# Patient Record
Sex: FEMALE | Race: WHITE | Employment: FULL TIME | URBAN - METROPOLITAN AREA
[De-identification: names, ages, dates, MRNs, and addresses within clinical notes are randomized per-mention and may not be internally consistent; named-entity substitution may affect disease eponyms.]

---

## 2017-07-14 ENCOUNTER — TELEPHONE (OUTPATIENT)
Dept: OBGYN CLINIC | Facility: CLINIC | Age: 22
End: 2017-07-14

## 2017-07-14 NOTE — TELEPHONE ENCOUNTER
LM with pharmacist stating that pt is overdue for annual exam and needs to call our office to schedule before refill can be addressed.

## 2017-08-18 ENCOUNTER — OFFICE VISIT (OUTPATIENT)
Dept: OBGYN CLINIC | Facility: CLINIC | Age: 22
End: 2017-08-18

## 2017-08-18 VITALS
DIASTOLIC BLOOD PRESSURE: 85 MMHG | WEIGHT: 213.81 LBS | HEIGHT: 69.5 IN | HEART RATE: 102 BPM | BODY MASS INDEX: 30.96 KG/M2 | SYSTOLIC BLOOD PRESSURE: 134 MMHG

## 2017-08-18 DIAGNOSIS — Z12.4 CERVICAL CANCER SCREENING: ICD-10-CM

## 2017-08-18 DIAGNOSIS — Z01.419 ENCOUNTER FOR GYNECOLOGICAL EXAMINATION WITHOUT ABNORMAL FINDING: Primary | ICD-10-CM

## 2017-08-18 PROCEDURE — 99395 PREV VISIT EST AGE 18-39: CPT | Performed by: OBSTETRICS & GYNECOLOGY

## 2017-08-18 RX ORDER — NORGESTIMATE AND ETHINYL ESTRADIOL 7DAYSX3 LO
1 KIT ORAL
Qty: 28 TABLET | Refills: 12 | Status: SHIPPED | OUTPATIENT
Start: 2017-08-18 | End: 2018-08-15

## 2017-08-18 NOTE — PROGRESS NOTES
Well Woman Exam    HPI:  The patient is a 22yo female who presents for annual exam. She has no GYN concerns. She has been on OCPs for PCO in the past and doing well with them. She is not currently sexually active. She has regular menses with her pills.  She constipation  Genitourinary:  denies dysuria, incontinence, abnormal vaginal discharge, vaginal itching  Musculoskeletal:  denies back pain. Skin/Breast:  Denies any breast pain, lumps, or discharge.    Neurological:  denies headaches, extremity weakness

## 2018-02-12 ENCOUNTER — HOSPITAL ENCOUNTER (OUTPATIENT)
Age: 23
Discharge: HOME | End: 2018-02-12
Attending: FAMILY MEDICINE
Payer: COMMERCIAL

## 2018-02-12 VITALS
SYSTOLIC BLOOD PRESSURE: 145 MMHG | BODY MASS INDEX: 29 KG/M2 | RESPIRATION RATE: 16 BRPM | DIASTOLIC BLOOD PRESSURE: 84 MMHG | WEIGHT: 200 LBS | OXYGEN SATURATION: 100 % | HEART RATE: 90 BPM | TEMPERATURE: 98 F

## 2018-02-12 DIAGNOSIS — J02.9 ACUTE PHARYNGITIS, UNSPECIFIED ETIOLOGY: Primary | ICD-10-CM

## 2018-02-12 LAB — S PYO AG THROAT QL: NEGATIVE

## 2018-02-12 PROCEDURE — 99202 OFFICE O/P NEW SF 15 MIN: CPT

## 2018-02-12 PROCEDURE — 99212 OFFICE O/P EST SF 10 MIN: CPT

## 2018-02-12 PROCEDURE — 87430 STREP A AG IA: CPT

## 2018-02-12 NOTE — ED PROVIDER NOTES
No chief complaint on file. HPI:     Fatoumata Patel is a 20year old female who presents with for chief complaint of  sore throat, low grade fever X 1 day. Noted exudates over the tonsils.    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 Neck: no adenopathy  RESPIRATORY:   Lungs: clear to auscultation bilaterally. No chest wall retractions. No respiratory distress.  No tachypnea noted  CARDIOVASCULAR:   Heart: S1, S2 normal, no murmur, click, rub or gallop, regular rate and rhyth

## 2018-04-16 ENCOUNTER — OFFICE VISIT (OUTPATIENT)
Dept: OTOLARYNGOLOGY | Facility: CLINIC | Age: 23
End: 2018-04-16

## 2018-04-16 VITALS
SYSTOLIC BLOOD PRESSURE: 112 MMHG | DIASTOLIC BLOOD PRESSURE: 70 MMHG | TEMPERATURE: 99 F | WEIGHT: 190 LBS | BODY MASS INDEX: 27.2 KG/M2 | HEIGHT: 70 IN

## 2018-04-16 DIAGNOSIS — J35.1 TONSILLAR HYPERTROPHY: ICD-10-CM

## 2018-04-16 DIAGNOSIS — K21.00 GASTROESOPHAGEAL REFLUX DISEASE WITH ESOPHAGITIS: Primary | ICD-10-CM

## 2018-04-16 PROCEDURE — 31575 DIAGNOSTIC LARYNGOSCOPY: CPT | Performed by: OTOLARYNGOLOGY

## 2018-04-16 PROCEDURE — 99212 OFFICE O/P EST SF 10 MIN: CPT | Performed by: OTOLARYNGOLOGY

## 2018-04-16 PROCEDURE — 99244 OFF/OP CNSLTJ NEW/EST MOD 40: CPT | Performed by: OTOLARYNGOLOGY

## 2018-04-16 RX ORDER — DEXAMETHASONE 6 MG/1
6 TABLET ORAL EVERY MORNING
Qty: 3 TABLET | Refills: 0 | Status: SHIPPED | OUTPATIENT
Start: 2018-04-16 | End: 2018-04-19

## 2018-04-16 RX ORDER — OMEPRAZOLE 40 MG/1
40 CAPSULE, DELAYED RELEASE ORAL DAILY
Qty: 30 CAPSULE | Refills: 11 | Status: SHIPPED | OUTPATIENT
Start: 2018-04-16 | End: 2018-04-19

## 2018-04-16 NOTE — PROGRESS NOTES
Aurora Kenyon is a 20year old female.   Patient presents with:  Throat Problem: pt reports recurring  sore throat pain, gets worse at night, very dry mouth, coughs after drinking for 1 month, pain when swallowing, had throat pain as a child      HISTORY Negative Fatigue, fever and weight loss. ENMT Negative Drooling. Eyes Negative Blurred vision and vision changes. Respiratory Negative Dyspnea and wheezing. Cardio Negative Chest pain, irregular heartbeat/palpitations and syncope.    GI Negative Abd Right: Normal, Left: Normal.   PROCEDURE: Fiberoptic laryngoscopy performed with topical lidocaine and oxymetazoline. After discussing indication's and potential side effects. The patient stated that they understood and wished for me to proceed.  Topica recommend a formal gastroenterology evaluation.    - Meenu Garcia    2.  Tonsillar hypertrophy  I recommended a trial of dexamethasone for a few days to see if her symptom goes away if his symptom goes away it may be because she has to

## 2018-04-19 ENCOUNTER — TELEPHONE (OUTPATIENT)
Dept: OTOLARYNGOLOGY | Facility: CLINIC | Age: 23
End: 2018-04-19

## 2018-04-19 RX ORDER — ESOMEPRAZOLE MAGNESIUM 40 MG/1
40 CAPSULE, DELAYED RELEASE ORAL
Qty: 30 CAPSULE | Refills: 11 | Status: SHIPPED | OUTPATIENT
Start: 2018-04-19 | End: 2018-04-24

## 2018-04-19 NOTE — TELEPHONE ENCOUNTER
Pt's LOV 4/16/18, GERD, omeprazole prescribed. Per pt, omeprazole is not covered by her insurance. Per pharmacy, generic nexium may be covered. Dr. Garrick Garcia, please advise if okay to change Rx and advise on dose and instructions.

## 2018-04-19 NOTE — TELEPHONE ENCOUNTER
Rx for generic nexium 40 mg sent to pt's pharmacy on file; yue per [facility].

## 2018-04-24 ENCOUNTER — TELEPHONE (OUTPATIENT)
Dept: OTOLARYNGOLOGY | Facility: CLINIC | Age: 23
End: 2018-04-24

## 2018-04-24 RX ORDER — ESOMEPRAZOLE MAGNESIUM 40 MG/1
40 CAPSULE, DELAYED RELEASE ORAL
Qty: 30 CAPSULE | Refills: 11 | Status: SHIPPED | OUTPATIENT
Start: 2018-04-24 | End: 2018-05-31 | Stop reason: ALTCHOICE

## 2018-04-24 NOTE — TELEPHONE ENCOUNTER
Vikki Flores At  [facility]  ([phone])  No PPI covered on pt plan, appeal will need to be completed which will require a letter of medical necessity. Please advise on letter of medical necessity. Letter will need to be faxed to [phone].

## 2018-04-24 NOTE — TELEPHONE ENCOUNTER
Pharmacist states Esomeprazole Magnesium 40 MG Oral Capsule Delayed Release is not covered by pts insurance. Pls advise thank you.

## 2018-04-24 NOTE — TELEPHONE ENCOUNTER
Pt would like esomeprazole magnesium to be sent to [facility] pharmacy on file script was sent to [place]. Please advise. Thank you.

## 2018-05-31 ENCOUNTER — OFFICE VISIT (OUTPATIENT)
Dept: FAMILY MEDICINE CLINIC | Facility: CLINIC | Age: 23
End: 2018-05-31

## 2018-05-31 VITALS
DIASTOLIC BLOOD PRESSURE: 78 MMHG | BODY MASS INDEX: 27 KG/M2 | WEIGHT: 190.38 LBS | TEMPERATURE: 98 F | HEART RATE: 88 BPM | SYSTOLIC BLOOD PRESSURE: 122 MMHG

## 2018-05-31 DIAGNOSIS — Z78.9 ENGAGES IN TRAVEL ABROAD: ICD-10-CM

## 2018-05-31 DIAGNOSIS — R09.81 NASAL CONGESTION: Primary | ICD-10-CM

## 2018-05-31 PROCEDURE — 99212 OFFICE O/P EST SF 10 MIN: CPT | Performed by: FAMILY MEDICINE

## 2018-05-31 PROCEDURE — 90471 IMMUNIZATION ADMIN: CPT | Performed by: FAMILY MEDICINE

## 2018-05-31 PROCEDURE — 99213 OFFICE O/P EST LOW 20 MIN: CPT | Performed by: FAMILY MEDICINE

## 2018-05-31 PROCEDURE — 90632 HEPA VACCINE ADULT IM: CPT | Performed by: FAMILY MEDICINE

## 2018-05-31 RX ORDER — FLUTICASONE PROPIONATE 50 MCG
2 SPRAY, SUSPENSION (ML) NASAL DAILY
Qty: 1 BOTTLE | Refills: 3 | Status: SHIPPED | OUTPATIENT
Start: 2018-05-31 | End: 2019-05-26

## 2018-05-31 RX ORDER — AZITHROMYCIN 250 MG/1
TABLET, FILM COATED ORAL
Qty: 6 TABLET | Refills: 0 | Status: SHIPPED | OUTPATIENT
Start: 2018-05-31 | End: 2018-08-20 | Stop reason: ALTCHOICE

## 2018-05-31 NOTE — PROGRESS NOTES
HPI:   Janae Tolliver is a 20year old female who presents for upper respiratory symptoms for  1  months. Patient reports sore throat, congestion, runny nose. Going to [facility] in 2 weeks.      Current Outpatient Prescriptions on File Prior to Visit:  Ricki Paige agrees to the plan. The patient is asked to return if sx's persist or worsen.     Gurwinder Cullen MD

## 2018-06-08 ENCOUNTER — TELEPHONE (OUTPATIENT)
Dept: INTERNAL MEDICINE CLINIC | Facility: CLINIC | Age: 23
End: 2018-06-08

## 2018-06-10 RX ORDER — ACETAZOLAMIDE 125 MG/1
125 TABLET ORAL 2 TIMES DAILY
Qty: 28 TABLET | Refills: 0 | Status: SHIPPED | OUTPATIENT
Start: 2018-06-10 | End: 2019-09-12

## 2018-08-13 RX ORDER — NORGESTIMATE AND ETHINYL ESTRADIOL
1 KIT
Qty: 28 TABLET | Refills: 3 | OUTPATIENT
Start: 2018-08-13

## 2018-08-15 RX ORDER — NORGESTIMATE AND ETHINYL ESTRADIOL 7DAYSX3 LO
1 KIT ORAL
Qty: 28 TABLET | Refills: 1 | Status: SHIPPED | OUTPATIENT
Start: 2018-08-15 | End: 2018-08-20

## 2018-08-20 ENCOUNTER — TELEPHONE (OUTPATIENT)
Dept: OBGYN CLINIC | Facility: CLINIC | Age: 23
End: 2018-08-20

## 2018-08-20 ENCOUNTER — OFFICE VISIT (OUTPATIENT)
Dept: OTOLARYNGOLOGY | Facility: CLINIC | Age: 23
End: 2018-08-20
Payer: COMMERCIAL

## 2018-08-20 VITALS
BODY MASS INDEX: 26.48 KG/M2 | DIASTOLIC BLOOD PRESSURE: 79 MMHG | SYSTOLIC BLOOD PRESSURE: 147 MMHG | TEMPERATURE: 99 F | WEIGHT: 185 LBS | HEIGHT: 70 IN

## 2018-08-20 DIAGNOSIS — J03.01 ACUTE RECURRENT STREPTOCOCCAL TONSILLITIS: Primary | ICD-10-CM

## 2018-08-20 PROCEDURE — 99214 OFFICE O/P EST MOD 30 MIN: CPT | Performed by: OTOLARYNGOLOGY

## 2018-08-20 PROCEDURE — 99212 OFFICE O/P EST SF 10 MIN: CPT | Performed by: OTOLARYNGOLOGY

## 2018-08-20 RX ORDER — NORGESTIMATE AND ETHINYL ESTRADIOL 7DAYSX3 LO
1 KIT ORAL
Qty: 28 TABLET | Refills: 0 | Status: SHIPPED | OUTPATIENT
Start: 2018-08-20 | End: 2018-09-17

## 2018-08-20 RX ORDER — AMOXICILLIN AND CLAVULANATE POTASSIUM 875; 125 MG/1; MG/1
1 TABLET, FILM COATED ORAL 2 TIMES DAILY
Qty: 20 TABLET | Refills: 0 | Status: SHIPPED | OUTPATIENT
Start: 2018-08-20 | End: 2018-08-30

## 2018-08-20 RX ORDER — DEXAMETHASONE 6 MG/1
6 TABLET ORAL EVERY MORNING
Qty: 3 TABLET | Refills: 0 | Status: SHIPPED | OUTPATIENT
Start: 2018-08-20 | End: 2018-08-23

## 2018-08-20 NOTE — TELEPHONE ENCOUNTER
Patient returning nurse call, [facility] in [address] [facility].

## 2018-08-20 NOTE — PROGRESS NOTES
Bruna Cevallos is a 24year old female.   Patient presents with:  Throat Problem: pt reports pain in throat and white spots in throat,      HISTORY OF PRESENT ILLNESS  4/16/2018  Patient presents for evaluation of intermittent right-sided pain in her throa Diagnosis Date   • PCO (polycystic ovaries)        History reviewed. No pertinent surgical history. REVIEW OF SYSTEMS    System Neg/Pos Details   Constitutional Negative Fatigue, fever and weight loss. ENMT Negative Drooling.    Eyes Negative Blurr Normal Left: Normal. Septum -Normal  Turbinates - Right: Normal, Left: Normal.        Current Outpatient Prescriptions:   •  dexamethasone 6 MG Oral Tab, Take 1 tablet (6 mg total) by mouth every morning., Disp: 3 tablet, Rfl: 0  •  Amoxicillin-Pot Lennox Jointer

## 2018-08-30 RX ORDER — NORGESTIMATE AND ETHINYL ESTRADIOL
1 KIT
Qty: 28 TABLET | Refills: 3 | OUTPATIENT
Start: 2018-08-30

## 2018-08-30 NOTE — TELEPHONE ENCOUNTER
LAST ANNUAL 8-18-17 (PAP NORMAL), NEXT ANNUAL 9-10-18. SENT BACK RX DENIED AND NOTED THE AUTHORIZATION FOR 1 PACK SENT TO THEIR STORE ON 8-20-18.

## 2018-09-17 ENCOUNTER — APPOINTMENT (OUTPATIENT)
Dept: LAB | Facility: HOSPITAL | Age: 23
End: 2018-09-17
Attending: OBSTETRICS & GYNECOLOGY
Payer: COMMERCIAL

## 2018-09-17 ENCOUNTER — OFFICE VISIT (OUTPATIENT)
Dept: OBGYN CLINIC | Facility: CLINIC | Age: 23
End: 2018-09-17
Payer: COMMERCIAL

## 2018-09-17 VITALS
HEART RATE: 73 BPM | WEIGHT: 202 LBS | BODY MASS INDEX: 29 KG/M2 | SYSTOLIC BLOOD PRESSURE: 114 MMHG | DIASTOLIC BLOOD PRESSURE: 69 MMHG

## 2018-09-17 DIAGNOSIS — Z01.419 ENCOUNTER FOR GYNECOLOGICAL EXAMINATION WITHOUT ABNORMAL FINDING: Primary | ICD-10-CM

## 2018-09-17 DIAGNOSIS — Z11.3 SCREEN FOR STD (SEXUALLY TRANSMITTED DISEASE): ICD-10-CM

## 2018-09-17 DIAGNOSIS — Z30.41 ENCOUNTER FOR BIRTH CONTROL PILLS MAINTENANCE: ICD-10-CM

## 2018-09-17 LAB
HBV SURFACE AG SERPL QL IA: NONREACTIVE
HCV AB SERPL QL IA: NONREACTIVE
HIV1+2 AB SERPL QL IA: NONREACTIVE
T PALLIDUM AB SER QL: NEGATIVE

## 2018-09-17 PROCEDURE — 99395 PREV VISIT EST AGE 18-39: CPT | Performed by: OBSTETRICS & GYNECOLOGY

## 2018-09-17 PROCEDURE — 87340 HEPATITIS B SURFACE AG IA: CPT

## 2018-09-17 PROCEDURE — 86803 HEPATITIS C AB TEST: CPT

## 2018-09-17 PROCEDURE — 36415 COLL VENOUS BLD VENIPUNCTURE: CPT

## 2018-09-17 PROCEDURE — 87389 HIV-1 AG W/HIV-1&-2 AB AG IA: CPT

## 2018-09-17 PROCEDURE — 86780 TREPONEMA PALLIDUM: CPT

## 2018-09-17 RX ORDER — NORGESTIMATE AND ETHINYL ESTRADIOL 7DAYSX3 LO
1 KIT ORAL
Qty: 28 TABLET | Refills: 12 | Status: SHIPPED | OUTPATIENT
Start: 2018-09-17

## 2018-09-17 NOTE — PROGRESS NOTES
Well Woman Exam    HPI:  The patient is a 16yo female who presents for annual exam. She has no complaints. She feels well. She likes her OCPs to help with PCOs. She is now living in [facility].     Reviewed medical and surgical history below   OBSTETRICS HIS daily., Disp: 28 tablet, Rfl: 12  •  acetaZOLAMIDE 125 MG Oral Tab, Take 1 tablet (125 mg total) by mouth 2 (two) times daily. , Disp: 28 tablet, Rfl: 0  •  Fluticasone Propionate 50 MCG/ACT Nasal Suspension, 2 sprays by Each Nare route daily. , Disp: 1 Philippe normal    Assessment/Plan:  1. WWE:   1. Reviewed ASCCP guidelines with the patient   2. Pap negative 2017- repeat 3 years  2. Contraception:   1. eRx sent for OCPs  3. Breast Health:     1.  Reviewed current guidelines with recommendation to start [facility]

## 2018-09-18 LAB
C TRACH DNA SPEC QL NAA+PROBE: NEGATIVE
N GONORRHOEA DNA SPEC QL NAA+PROBE: NEGATIVE

## 2018-09-19 LAB — T VAGINALIS RRNA SPEC QL NAA+PROBE: NEGATIVE

## 2019-01-21 ENCOUNTER — TELEPHONE (OUTPATIENT)
Dept: OBGYN CLINIC | Facility: CLINIC | Age: 24
End: 2019-01-21

## 2019-01-21 NOTE — TELEPHONE ENCOUNTER
C/O SOME SPOTTING AND LIGHT BLEEDING SEVERAL DAYS IN THE PAST WEEK WHICH IS 3RD WEEK OF PILL PACK. HAS BEEN WORKING OUT A LITTLE MORE AND ALSO STATES SHE TOOK PILLS CORRECTLY. SHE MAY HAVE TAKEN HER PILL A FEW HOURS LATE BUT THAT IS ALL.   ASKED PT TO MON

## 2019-09-12 ENCOUNTER — OFFICE VISIT (OUTPATIENT)
Dept: FAMILY MEDICINE CLINIC | Facility: CLINIC | Age: 24
End: 2019-09-12
Payer: COMMERCIAL

## 2019-09-12 ENCOUNTER — PATIENT MESSAGE (OUTPATIENT)
Dept: FAMILY MEDICINE CLINIC | Facility: CLINIC | Age: 24
End: 2019-09-12

## 2019-09-12 ENCOUNTER — APPOINTMENT (OUTPATIENT)
Dept: LAB | Age: 24
End: 2019-09-12
Attending: NURSE PRACTITIONER
Payer: COMMERCIAL

## 2019-09-12 VITALS
SYSTOLIC BLOOD PRESSURE: 113 MMHG | DIASTOLIC BLOOD PRESSURE: 70 MMHG | WEIGHT: 195 LBS | HEART RATE: 66 BPM | BODY MASS INDEX: 27.92 KG/M2 | HEIGHT: 70 IN

## 2019-09-12 DIAGNOSIS — E55.9 VITAMIN D DEFICIENCY: ICD-10-CM

## 2019-09-12 DIAGNOSIS — M25.562 CHRONIC PAIN OF LEFT KNEE: Primary | ICD-10-CM

## 2019-09-12 DIAGNOSIS — G89.29 CHRONIC PAIN OF LEFT KNEE: Primary | ICD-10-CM

## 2019-09-12 DIAGNOSIS — Z00.00 WELL ADULT EXAM: ICD-10-CM

## 2019-09-12 DIAGNOSIS — Z00.00 WELL ADULT EXAM: Primary | ICD-10-CM

## 2019-09-12 DIAGNOSIS — M25.552 LEFT HIP PAIN: ICD-10-CM

## 2019-09-12 DIAGNOSIS — J35.01 CHRONIC TONSILLITIS: ICD-10-CM

## 2019-09-12 DIAGNOSIS — E28.2 PCOS (POLYCYSTIC OVARIAN SYNDROME): ICD-10-CM

## 2019-09-12 DIAGNOSIS — J30.1 SEASONAL ALLERGIC RHINITIS DUE TO POLLEN: ICD-10-CM

## 2019-09-12 LAB
ALBUMIN SERPL-MCNC: 3.9 G/DL (ref 3.4–5)
ALBUMIN/GLOB SERPL: 1.1 {RATIO} (ref 1–2)
ALP LIVER SERPL-CCNC: 47 U/L (ref 37–98)
ALT SERPL-CCNC: 21 U/L (ref 13–56)
ANION GAP SERPL CALC-SCNC: 7 MMOL/L (ref 0–18)
AST SERPL-CCNC: 9 U/L (ref 15–37)
BILIRUB SERPL-MCNC: 0.6 MG/DL (ref 0.1–2)
BUN BLD-MCNC: 9 MG/DL (ref 7–18)
BUN/CREAT SERPL: 9.9 (ref 10–20)
CALCIUM BLD-MCNC: 9.3 MG/DL (ref 8.5–10.1)
CHLORIDE SERPL-SCNC: 109 MMOL/L (ref 98–112)
CHOLEST SMN-MCNC: 171 MG/DL (ref ?–200)
CO2 SERPL-SCNC: 25 MMOL/L (ref 21–32)
CREAT BLD-MCNC: 0.91 MG/DL (ref 0.55–1.02)
DEPRECATED RDW RBC AUTO: 40.4 FL (ref 35.1–46.3)
ERYTHROCYTE [DISTWIDTH] IN BLOOD BY AUTOMATED COUNT: 12 % (ref 11–15)
GLOBULIN PLAS-MCNC: 3.4 G/DL (ref 2.8–4.4)
GLUCOSE BLD-MCNC: 87 MG/DL (ref 70–99)
HCT VFR BLD AUTO: 42.1 % (ref 35–48)
HDLC SERPL-MCNC: 78 MG/DL (ref 40–59)
HGB BLD-MCNC: 13.7 G/DL (ref 12–16)
LDLC SERPL CALC-MCNC: 74 MG/DL (ref ?–100)
M PROTEIN MFR SERPL ELPH: 7.3 G/DL (ref 6.4–8.2)
MCH RBC QN AUTO: 29.9 PG (ref 26–34)
MCHC RBC AUTO-ENTMCNC: 32.5 G/DL (ref 31–37)
MCV RBC AUTO: 91.9 FL (ref 80–100)
NONHDLC SERPL-MCNC: 93 MG/DL (ref ?–130)
OSMOLALITY SERPL CALC.SUM OF ELEC: 290 MOSM/KG (ref 275–295)
PATIENT FASTING: YES
PATIENT FASTING: YES
PLATELET # BLD AUTO: 251 10(3)UL (ref 150–450)
POTASSIUM SERPL-SCNC: 4.1 MMOL/L (ref 3.5–5.1)
RBC # BLD AUTO: 4.58 X10(6)UL (ref 3.8–5.3)
SODIUM SERPL-SCNC: 141 MMOL/L (ref 136–145)
TRIGL SERPL-MCNC: 96 MG/DL (ref 30–149)
TSI SER-ACNC: 1.54 MIU/ML (ref 0.36–3.74)
VIT B12 SERPL-MCNC: 235 PG/ML (ref 193–986)
VLDLC SERPL CALC-MCNC: 19 MG/DL (ref 0–30)
WBC # BLD AUTO: 5.9 X10(3) UL (ref 4–11)

## 2019-09-12 PROCEDURE — 99385 PREV VISIT NEW AGE 18-39: CPT | Performed by: NURSE PRACTITIONER

## 2019-09-12 PROCEDURE — 82306 VITAMIN D 25 HYDROXY: CPT

## 2019-09-12 PROCEDURE — 80053 COMPREHEN METABOLIC PANEL: CPT

## 2019-09-12 PROCEDURE — 84443 ASSAY THYROID STIM HORMONE: CPT

## 2019-09-12 PROCEDURE — 36415 COLL VENOUS BLD VENIPUNCTURE: CPT

## 2019-09-12 PROCEDURE — 80061 LIPID PANEL: CPT

## 2019-09-12 PROCEDURE — 85027 COMPLETE CBC AUTOMATED: CPT

## 2019-09-12 PROCEDURE — 82607 VITAMIN B-12: CPT

## 2019-09-12 NOTE — TELEPHONE ENCOUNTER
From: Arno Pallas  To: ESTUARDO Grimm, CESARP-C  Sent: 9/12/2019 10:15 AM CDT  Subject: Visit Follow-up Question    Hello,    A quick question I forgot to ask is that [facility] noticed with certain cardio/physical activity (running or biking) that I f

## 2019-09-12 NOTE — PATIENT INSTRUCTIONS
https://www.maricarmen.pretty/. com/wp-content/uploads/2018/02/PCOS-eBook. pdf    ExcitingPage.co.ThingMagic. com › pcos  Prevention Guidelines, Women Ages 25 to 44  Screening tests and vaccines are an important part of managing your health.  A screening diabetes during pregnancy after the 24th week.    Type 2 diabetes, prediabetes All women diagnosed with gestational diabetes Lifelong testing every 3 years   Type 2 diabetes All women with prediabetes Every year   Gonorrhea Sexually active women at [facility] rubella (MMR) All women in this age group who have no record of these infections or vaccines 1 or 2 doses   Meningococcal Women at increased risk for infection should talk with their healthcare provider 1 or more doses   Pneumococcal conjugate vaccine (PCV professional medical care. Always follow your healthcare professional's instructions.       ALLERGIC RHINITIS    -Take otc allergy medications as directed (over the counter, generic Claritin, Zyrtec, Xyzal or Allegra)    -use of steroidal nasal spray as adv

## 2019-09-13 LAB — 25(OH)D3 SERPL-MCNC: 28.4 NG/ML (ref 30–100)

## 2019-10-08 ENCOUNTER — OFFICE VISIT (OUTPATIENT)
Dept: NEUROLOGY | Facility: CLINIC | Age: 24
End: 2019-10-08
Payer: COMMERCIAL

## 2019-10-08 VITALS
DIASTOLIC BLOOD PRESSURE: 64 MMHG | HEIGHT: 70 IN | SYSTOLIC BLOOD PRESSURE: 120 MMHG | RESPIRATION RATE: 16 BRPM | HEART RATE: 68 BPM | BODY MASS INDEX: 27.92 KG/M2 | WEIGHT: 195 LBS

## 2019-10-08 DIAGNOSIS — M24.852 SNAPPING HIP SYNDROME, LEFT: Primary | ICD-10-CM

## 2019-10-08 PROCEDURE — 99204 OFFICE O/P NEW MOD 45 MIN: CPT | Performed by: PHYSICAL MEDICINE & REHABILITATION

## 2019-10-08 NOTE — H&P
[facility]    History and Physical    [facility] Patient Status:  No patient class for patient encounter    1995 MRN [id number]   Location [place] [address] Attending No att. providers found   Hosp Day # 0 P Maternal Grandmother         Breast   • Diabetes Maternal Grandfather      Social History:  Social History    Tobacco Use      Smoking status: Never Smoker      Smokeless tobacco: Never Used    Alcohol use:  Yes      Alcohol/week: 0.0 standard drinks      C hip exam:  Normal ROM of the left hip; pain at terminal adduction, lateral hip pain.  No pain with scouring of the left hip.    left Knee exam  Observation: no appreciable joint swelling    Range of motion:  Flexion: 120 degrees  Extension: 0 degrees    Pal

## 2019-10-08 NOTE — PATIENT INSTRUCTIONS
Call the clinic after PT if no improvement in the medial left knee pain and I will order an MRI of your knee.

## 2020-07-28 ENCOUNTER — TELEPHONE (OUTPATIENT)
Dept: FAMILY MEDICINE CLINIC | Facility: CLINIC | Age: 25
End: 2020-07-28

## 2020-07-28 NOTE — TELEPHONE ENCOUNTER
----- Message from Daniel Latham sent at 7/28/2020  9:52 AM CDT -----  Regarding: Other  Contact: [phone]  Hello, I have been getting headaches for the past 4-5 days that \"come and go\" throughout the day, primarily in the frontal region and near m

## 2020-07-28 NOTE — TELEPHONE ENCOUNTER
Spoke with patient (name and  verified)   Reports having frontal lobe headaches and pressure to her eyes for 4 days. Denies nasal sinus congestion, but sometimes in the morning. Denies sore throat or fever.     Has been living in [place] for 2 month

## 2022-11-10 NOTE — TELEPHONE ENCOUNTER
Can take acetazolamide BID - start 24 hours before planned hike ( ascent) and can stop on return of hike (descent)
Left detailed VM for pt that med was sent to pharmacy
Patient is travelling to [place] next Thursday and it was recommended that she get medication from her PCP for altitude sickness
Pt called stating she is traveling and would like to know if she can be prescribed a small amount of medication that would help with altitude sickness.
ObHx:   h/o 6 FT , largest 9-1    GynHx: denies history of fibroids, ovarian cysts, abnormal PAPs, and STIs

## (undated) NOTE — LETTER
[facility] [address]   Date:   10/8/2019     Name:   Tae Sales    YOB: 1995   MRN:   [id number]       [facility]? Anton the areas on your body where you feel the described sensations.   Use the appropriate sym

## (undated) NOTE — LETTER
Kasey Molina, [address]  [address]       04/16/18        Patient: oJssue Espinoza   YOB: 1995   Date of Visit: 4/16/2018       Dear  Dr. Melanie Alston MD,      Thank you for referring Jossue Espinoza to my